# Patient Record
Sex: FEMALE | Race: WHITE | NOT HISPANIC OR LATINO | URBAN - METROPOLITAN AREA
[De-identification: names, ages, dates, MRNs, and addresses within clinical notes are randomized per-mention and may not be internally consistent; named-entity substitution may affect disease eponyms.]

---

## 2019-06-05 ENCOUNTER — EMERGENCY (EMERGENCY)
Facility: HOSPITAL | Age: 59
LOS: 0 days | Discharge: HOME | End: 2019-06-05
Attending: EMERGENCY MEDICINE | Admitting: EMERGENCY MEDICINE
Payer: OTHER MISCELLANEOUS

## 2019-06-05 VITALS
OXYGEN SATURATION: 98 % | RESPIRATION RATE: 16 BRPM | SYSTOLIC BLOOD PRESSURE: 116 MMHG | DIASTOLIC BLOOD PRESSURE: 71 MMHG | HEIGHT: 65 IN | HEART RATE: 80 BPM | WEIGHT: 166.89 LBS | TEMPERATURE: 98 F
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
## 2019-06-05 DIAGNOSIS — S80.211A ABRASION, RIGHT KNEE, INITIAL ENCOUNTER: ICD-10-CM

## 2019-06-05 DIAGNOSIS — S82.62XA DISPLACED FRACTURE OF LATERAL MALLEOLUS OF LEFT FIBULA, INITIAL ENCOUNTER FOR CLOSED FRACTURE: ICD-10-CM
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
## 2019-06-05 DIAGNOSIS — M25.572 PAIN IN LEFT ANKLE AND JOINTS OF LEFT FOOT: ICD-10-CM
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
## 2019-06-05 PROCEDURE — 73562 X-RAY EXAM OF KNEE 3: CPT | Mod: 26,RT

## 2019-06-05 PROCEDURE — 73610 X-RAY EXAM OF ANKLE: CPT | Mod: 26,LT

## 2019-06-05 PROCEDURE — 99284 EMERGENCY DEPT VISIT MOD MDM: CPT

## 2019-06-05 RX ORDER — ACETAMINOPHEN 500 MG
650 TABLET ORAL ONCE
Refills: 0 | Status: COMPLETED | OUTPATIENT
Start: 2019-06-05 | End: 2019-06-05

## 2019-06-05 RX ADMIN — Medication 650 MILLIGRAM(S): at 16:25

## 2019-06-05 NOTE — ED PROVIDER NOTE - PROGRESS NOTE DETAILS
discussed xray results with patient. patient refusing crutches or ankle splint. given knee immobilizer for comfort. patient verbalizes understanding of return precautions. supervised care of this patient .  patient was placed in knee immobilizer . patient is refusing to keep knee immobilizer will place ace wrap

## 2019-06-05 NOTE — ED PROVIDER NOTE - OBJECTIVE STATEMENT
58 year old female smoker w no pmhx, not on anticoagulation presents to the ED with left ankle pain and right knee pain after a mechanical trip and fall 3 hours prior to arrival. Pain is mild, constant, worse with movement of her ankle or knee, somewhat relieved by two advil taken 2 hours ago. Patient was walking across the street when she twisted her ankle in a pot hole and fell onto her knee. No head injury or LOC. Able to ambulate since the fall but with pain. No numnbess/weakness/paresthesias. Tetanus is UTD.

## 2019-06-05 NOTE — ED PROVIDER NOTE - PHYSICAL EXAMINATION
VITALS:  I have reviewed the initial vital signs.  GENERAL: Well-developed, well-nourished, in no acute distress.  HEENT: Sclera clear. Mucous membranes moist.  NECK: supple w FROM.  CARDIO: RRR, nl S1 and S2. No murmurs, rubs, or gallops. 2+ DP pulses bilaterally.  PULM: Normal effort. CTA b/l without wheezes, rales, or rhonchi.  MSK: Right knee with multiple superficial abrasions anteriorly. No active bleeding. +ttp over anterior patella w/o crepitus/deformity/swelling. FROM at knee w/o pain. No femur or tib/fib ttp. FROM at hip and ankle. Left lateral malleolar ttp and swelling. No ecchymosis. No tib/fib, foot, or calcaneal ttp. FROM at this ankle and knee w/o pain.  SKIN: Warm, dry.  Capillary refill <2 seconds.  NEURO: A&Ox3. Speech clear. 5/5 strength to lower extremities b/l. Sensation intact and equal throughout.

## 2019-06-05 NOTE — ED PROVIDER NOTE - CLINICAL SUMMARY MEDICAL DECISION MAKING FREE TEXT BOX
Pt ambulatory. No acute fx appreciated on XR. Feels and appears well. No complaints at present. Eager to leave. Stable for discharge. Patient was given strict return and follow up precautions. The patient has been informed of all concerning signs and symptoms to return to Emergency Department, the necessity to follow up with PMD/Clinic/follow up provided within 2-3 days was explained, and the patient reports understanding of above with capacity and insight.

## 2019-06-05 NOTE — ED PROVIDER NOTE - CARE PROVIDER_API CALL
Christopher Elizabeth)  Orthopaedic Surgery  [address] [place] [address]  Phone: [phone]  Fax: [phone]  Follow Up Time:

## 2019-06-05 NOTE — ED PROVIDER NOTE - NS ED ROS FT
CONSTITUTIONAL: (-) fevers, (-) chills  NECK: (-) neck pain, (-) neck stiffness, (-) lymphadenopathy  CARDIO: (-) chest pain, (-) palpitations  PULM: (-) cough, (-) sputum, (-) shortness of breath  GI: (-) nausea, (-) vomiting, (-) diarrhea, (-) abdominal pain  MSK: see HPI, (-) back pain, (-) gait difficulty  SKIN: (+) abrasions to right knee, (-) rashes, (-) ecchymosis  NEURO: (-) headache, (-) head injury, (-) LOC, (-) dizziness, (-) lightheadedness, (-) syncope, (-) numbness, (-) weakness, (-) paresthesias    *all other systems negative except as documented above and in the HPI*

## 2019-06-10 ENCOUNTER — EMERGENCY (EMERGENCY)
Facility: HOSPITAL | Age: 59
LOS: 0 days | Discharge: HOME | End: 2019-06-10
Attending: EMERGENCY MEDICINE | Admitting: EMERGENCY MEDICINE
Payer: COMMERCIAL

## 2019-06-10 VITALS
RESPIRATION RATE: 16 BRPM | DIASTOLIC BLOOD PRESSURE: 88 MMHG | OXYGEN SATURATION: 99 % | HEIGHT: 64 IN | WEIGHT: 164.91 LBS | SYSTOLIC BLOOD PRESSURE: 141 MMHG | HEART RATE: 71 BPM | TEMPERATURE: 98 F

## 2019-06-10 DIAGNOSIS — M79.673 PAIN IN UNSPECIFIED FOOT: ICD-10-CM

## 2019-06-10 DIAGNOSIS — S82.892A OTHER FRACTURE OF LEFT LOWER LEG, INITIAL ENCOUNTER FOR CLOSED FRACTURE: ICD-10-CM
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
## 2019-06-10 PROBLEM — Z78.9 OTHER SPECIFIED HEALTH STATUS: Chronic | Status: ACTIVE | Noted: 2019-06-05

## 2019-06-10 PROCEDURE — 29515 APPLICATION SHORT LEG SPLINT: CPT | Mod: LT

## 2019-06-10 PROCEDURE — 99283 EMERGENCY DEPT VISIT LOW MDM: CPT | Mod: 25

## 2019-06-10 NOTE — ED PROVIDER NOTE - CARE PROVIDER_API CALL
Vaughn Villagomez (MD)  Orthopaedic Surgery  [address] [place] [address]  Phone: [phone]  Fax: [phone]  Follow Up Time: 1-3 Days

## 2019-06-10 NOTE — ED PROVIDER NOTE - PHYSICAL EXAMINATION
--EXAM--  VITAL SIGNS: I have reviewed vs documented at present.  CONSTITUTIONAL: Well-developed; well-nourished; in no acute distress.   SKIN: Warm and dry, no acute rash.   NECK: non tender.  EXT: Normal ROM, +ttp to left lat mall.  NEURO: Strength 5/5 in all extremities. Sensation intact throughout.

## 2019-06-10 NOTE — ED ADULT NURSE NOTE - NSIMPLEMENTINTERV_GEN_ALL_ED
Implemented All Universal Safety Interventions:  [place] to call system. Call bell, personal items and telephone within reach. Instruct patient to call for assistance. Room bathroom lighting operational. Non-slip footwear when patient is off stretcher. Physically safe environment: no spills, clutter or unnecessary equipment. Stretcher in lowest position, wheels locked, appropriate side rails in place.

## 2019-06-10 NOTE — ED PROVIDER NOTE - CLINICAL SUMMARY MEDICAL DECISION MAKING FREE TEXT BOX
Pt called back secondary to fx on radiology read of ankle xray.  No knee ttp at present.  +cont ttp to left lat malleolus.  Pt splinted and will f/u with ortho as outpt this week.   Patient was discharged from the ED. Any available test results were discussed with the patient. Verbal instructions were given, including instructions to return to ED immediately for any new, worsening, or concerning symptoms. Patient endorsed understanding. Written discharge instructions additionally given, including follow-up plan.

## 2019-06-10 NOTE — ED PROVIDER NOTE - NS ED ROS FT
Review of Systems:  	•	CONSTITUTIONAL: no fever, no diaphoresis, no chills  	•	SKIN: no rash  	•	HEMATOLOGIC: no bleeding, no bruising  	•	RESPIRATORY: no shortness of breath, no cough  	•	CARDIAC: no chest pain, no palpitations  	•	MUSCULOSKELETAL: no joint paint, + swelling, no redness  	•	NEUROLOGIC: no weakness, no paresthesias

## 2019-08-01 NOTE — ED ADULT NURSE NOTE - PAIN RATING/NUMBER SCALE (0-10): ACTIVITY
Ellen Brandon is a 26 year old female presenting with follow up Chronic migraine  Pt states having 3-4 migraines   Denies known Latex allergy or symptoms of Latex sensitivity.  Medications verified, no changes.  Social History     Tobacco Use   Smoking Status Never Smoker   Smokeless Tobacco Never Used        5